# Patient Record
(demographics unavailable — no encounter records)

---

## 2025-04-09 NOTE — REASON FOR VISIT
[FreeTextEntry1] : Chronic leukopenia (follows with Heme Onc) MV repair [place]32[place] ring plus leaflet [place] [facility] [place] TV [place]30[place] ring [place] [facility] [place]   He feels well Echo today for his MVR/TVR He is exercising regularly BP is elevated today but this is not his norm

## 2025-04-09 NOTE — ASSESSMENT
[FreeTextEntry1] : He is clinically stable He will continue to follow with Dr. Nunez for health maintenance Endocarditis prophylaxis reviewed